# Patient Record
Sex: FEMALE | Race: BLACK OR AFRICAN AMERICAN
[De-identification: names, ages, dates, MRNs, and addresses within clinical notes are randomized per-mention and may not be internally consistent; named-entity substitution may affect disease eponyms.]

---

## 2020-08-04 ENCOUNTER — HOSPITAL ENCOUNTER (EMERGENCY)
Dept: HOSPITAL 74 - JER | Age: 25
LOS: 1 days | Discharge: HOME | End: 2020-08-05
Payer: COMMERCIAL

## 2020-08-04 VITALS — BODY MASS INDEX: 24 KG/M2
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
## 2020-08-04 DIAGNOSIS — R51: ICD-10-CM

## 2020-08-04 DIAGNOSIS — M54.2: Primary | ICD-10-CM

## 2020-08-04 PROCEDURE — 3E0233Z INTRODUCTION OF ANTI-INFLAMMATORY INTO MUSCLE, PERCUTANEOUS APPROACH: ICD-10-PCS
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
## 2020-08-05 VITALS — HEART RATE: 78 BPM | SYSTOLIC BLOOD PRESSURE: 138 MMHG | TEMPERATURE: 98.2 F | DIASTOLIC BLOOD PRESSURE: 79 MMHG
